# Patient Record
Sex: FEMALE | URBAN - METROPOLITAN AREA
[De-identification: names, ages, dates, MRNs, and addresses within clinical notes are randomized per-mention and may not be internally consistent; named-entity substitution may affect disease eponyms.]

---

## 2017-03-23 NOTE — PROCEDURE NOTE: CLINICAL
PROCEDURE NOTE: Focal Laser, Choroid OD. Diagnosis: Neovascular AMD with Active CNV. Prior to focal laser, risks/benefits/alternatives to laser discussed including scotoma and loss of vision and patient wished to proceed. A written consent is on file, and the need for today’s laser was discussed and the patient is understanding and wishes to proceed. Spot size: 500 um. Pulse power: 850 mW. Number of pulses: 79. Duration:100ms. Procedure Time: 11:54 AM. Patient tolerated procedure well. There were no complications. Post procedure instructions given. Patient given office phone number/answering service number and advised to call immediately should there be loss of vision or pain, or should they have any other questions or concerns. Zahida Mares

## 2017-03-30 NOTE — PROCEDURE NOTE: CLINICAL
PROCEDURE NOTE: Avastin 1.25mg OD. Diagnosis: Neovascular AMD with Active CNV. Anesthesia: Topical. Prep: Betadine Drops. Prior to injection, risks/benefits/alternatives discussed including infection, loss of vision, hemorrhage, cataract, glaucoma, retinal tears or detachment. The off-label status of Intravitreal Avastin also was reviewed. The patient wished to proceed with treatment. Topical anesthesia was induced with Alcaine. Additional anesthesia was achieved using drop(s) or injection checked above. A drop of Povidone-iodine 5% ophthalmic solution was instilled over the injection site and in the inferior fornix. Betadine prep was performed. Using the syringe provided, Avastin 1.25 mg in 0.05 cc was injected into the vitreous cavity. The needle was passed 3.0 mm posterior to the limbus in pseudophakic patients, and 3.5 mm posterior to the limbus in phakic patients. Patient tolerated procedure well. There were no complications. Injection time: 402. Lot #: Esqupo@[email or web address]. Expiration Date: 5/2/2017. Inventory Id: null. Post-op instructions given. The patient was instructed to return for re-evaluation in approximately 4-12 weeks depending on his/her condition and was told to call immediately if vision decreases and/or if his/her eye becomes red, painful, and/or light sensitive. The patient was instructed to go to the emergency room or call 911 if unable to reach the doctor within an hour or two of trying or calling. The patient was instructed to use Artificial Tears q.i.d. p.r.n for comfort. Selma Perez

## 2017-05-15 NOTE — PROCEDURE NOTE: CLINICAL
ANNUAL EXAM note      History    Tyra Mg is a 43 year old female who presents for an annual exam and Pap. Patient denies any abnormal vaginal discharge or bleeding. Has been postmenopausal since 2004. Is on estrogen patch twice a week with no problems. Is in a relationship for less than 1 year. Denies any STD screening. Is scheduled for bone density by her endocrinologist in the near future. Patient recently had fasting lipid panel done through work and will drop off a copy.    Last Menstrual Period: Patient's last menstrual period was 01/01/2004.  Last PAP: 8/2012  Birth Control Method: Postmenopausal  Last Mammogram: 8/2016  Self Breast Exam:  yes  Last Dexascan (BMD):  Yrs ago  Exercise: daily walks and swims  Calcium Intake: thru diet  Vitamin D Intake: 50,000 2 x a week  Last Colonoscopy: 2014 at [facility] (Servings per day)  Vegetables 4  Fruit 2  Dairy 3  Protein 3  Carbs 2  Water 64 oz  Caffeine: 2 sodas diet pepsi  Dining out in one week: 0      medical history    Past Medical History   Diagnosis Date   • Acute pyelonephritis without lesion of renal medullary necrosis 6/12/2012   • Adhesive capsulitis of right shoulder 7/23/2014     Shoulder, knee and ankle   • Arterial ischemic stroke, vertebrobasilar, brainstem, remote, resolved 2003   • AV reentrant tachycardia    • Carotid artery dissection 2003     Patient suffered with left MCA infarction.   • Cerebral infarction 2003   • Chronic vomiting    • Cushing's disease    • Depression      situational   • Esophageal reflux    • Fracture 2011     tib/fib   • Fracture 2009     humerus   • Fracture dislocation of cervical spine    • Gastritis    • Hypotension    • MRSA (methicillin resistant staph aureus) culture positive 5/29/2014     PCR (+)   • Nephrolithiasis    • Occipital neuralgia    • Personal history of traumatic fracture      tibia, fibia, humerus   • Pheochromocytoma    • Pneumonia      frequently   • PONV (postoperative nausea  PROCEDURE NOTE: Avastin 1.25mg OD. Diagnosis: Neovascular AMD with Active CNV. Anesthesia: Topical. Prep: Betadine Drops. Prior to injection, risks/benefits/alternatives discussed including infection, loss of vision, hemorrhage, cataract, glaucoma, retinal tears or detachment. The off-label status of Intravitreal Avastin also was reviewed. The patient wished to proceed with treatment. Topical anesthesia was induced with Alcaine. Additional anesthesia was achieved using drop(s) or injection checked above. A drop of Povidone-iodine 5% ophthalmic solution was instilled over the injection site and in the inferior fornix. Betadine prep was performed. Using the syringe provided, Avastin 1.25 mg in 0.05 cc was injected into the vitreous cavity. The needle was passed 3.0 mm posterior to the limbus in pseudophakic patients, and 3.5 mm posterior to the limbus in phakic patients. Patient tolerated procedure well. There were no complications. Injection time: 12:15 PM. Lot #: Nelly@[email or web address]. Expiration Date: 6729-37-98W09:00:00. Inventory Id: null. Post-op instructions given. The patient was instructed to return for re-evaluation in approximately 4-12 weeks depending on his/her condition and was told to call immediately if vision decreases and/or if his/her eye becomes red, painful, and/or light sensitive. The patient was instructed to go to the emergency room or call 911 if unable to reach the doctor within an hour or two of trying or calling. The patient was instructed to use Artificial Tears q.i.d. p.r.n for comfort. David Faria and vomiting)    • Prolonged Q-T interval on ECG 3/2015   • RAD (reactive airway disease)    • Rotator cuff disorder 6/20/2014   • Seizure    • Seizures    • Spinal headache    • SVT (supraventricular tachycardia)    • Unspecified hypothyroidism 4/3/2015   • Urinary tract infection        SURGICAL history    Past Surgical History   Procedure Laterality Date   • Pituitary surgery     • Adrenalectomy  2003     transphenoidal   • Sinus surgery     • Hb inject nerve occipital  12/13/2012     occipital nerve block   • Knee arthroscopy w/ meniscectomy       Left knee   • Service to gastroenterology  1/2011     Colonoscopy-int. hemorrhoids   • Service to gastroenterology  2/27/2012     EGD gastritis, esophagitis     • Ep study/possible svt ablation  7/14/2014     of AVNRT   • Ep study  10/2014     no inducible SVT/VT   • Brain surgery  2002, 2003     Pituitary 2x   • Eye surgery       cross eye correction when a child   • Removal gallbladder     • Appendectomy  7/2004     partial   • Cardiac surgery       SVT ablasion   • Adrenalectomy  9/2003     adrenalcortical insufficiency   • Adrenal gland surgery Bilateral 2002       social history    Social History     Social History   • Marital status: Single     Spouse name: N/A   • Number of children: N/A   • Years of education: N/A     Social History Main Topics   • Smoking status: Former Smoker     Packs/day: 0.10     Years: 3.00     Types: Cigarettes     Start date: 7/23/1993     Quit date: 1/20/2001   • Smokeless tobacco: Never Used   • Alcohol use No      Comment: very rarely   • Drug use: No   • Sexual activity: Yes     Partners: Male      Comment: x one year     Other Topics Concern   • Weight Concern Yes     Social History Narrative       family history    Family History   Problem Relation Age of Onset   • Thyroid Father    • Arthritis Father    • OTHER Father      Afib., Vit B12 and D deficient   • NEGATIVE FAMILY HX OF Mother    • Arthritis Mother    • High cholesterol  Mother    • Substance abuse Mother      alcoholic   • OTHER Sister      chrons disease   • OTHER Brother      as infant had umbilical hernia   • Depression Maternal Grandmother    • Diabetes Paternal Grandmother    • OTHER Paternal Grandfather      Afib       mEDICATIONS    Current Outpatient Prescriptions   Medication Sig   • potassium chloride (K-DUR,KLOR-CON) 20 MEQ CR tablet Take 1 tablet by mouth daily.   • hydrocortisone (CORTEF) 20 MG tablet take 2 tabs in the morning and 1 tab in the evening for the next 2-3 days and then resume home doses of 20mg in AM and 20 mg in PM   • sodium polystyrene sulfonate (SPS, KAYEXALATE) 15 GM/60ML suspension 60 ml (15 GM) twice a day as instructed   • olopatadine (PATADAY) 0.2 % ophthalmic solution Apply 1 drop into the left eye daily.   • metoCLOPramide (REGLAN) 5 MG tablet Take 1 tablet by mouth 4 times daily as needed for Nausea.   • ondansetron (ZOFRAN) 4 MG tablet Take 1 tablet by mouth every 8 hours as needed for Nausea.   • albuterol (VENTOLIN) (2.5 MG/3ML) 0.083% nebulizer solution Take 2.5 mg by nebulization every 6 hours as needed for Wheezing.   • topiramate (TOPAMAX) 100 MG tablet Take 1 tablet by mouth 2 times daily.   • levothyroxine (SYNTHROID, LEVOTHROID) 112 MCG tablet Take 112 mcg by mouth daily.   • fludrocortisone (FLORINEF) 0.1 MG tablet Take 0.15 mg by mouth daily. Dose: 1 and ½ tablet (= 0.15 mg)   • pantoprazole (PROTONIX) 40 MG tablet Take 40 mg by mouth 2 times daily.   • ergocalciferol (DRISDOL) 32063 UNITS capsule Take 50,000 Units by mouth twice a week. Take on Wednesday and Sunday.   • estradiol (VIVELLE-DOT) 0.1 MG/24HR patch Place 1 patch onto the skin twice a week. Apply one patch on Sunday and Wednesday   • folic acid (FOLATE) 1 MG tablet Take 1 tablet by mouth daily.   • midodrine (PROAMATINE) 5 MG tablet Take 10 mg by mouth 3 times daily. Dose: 2 Tabs (= 10 mg)   • oral electrolytes (THERMOTABS) TABS Take 1 tablet by mouth 3 times daily as  needed.      No current facility-administered medications for this visit.        aLLERGIES    ALLERGIES:   Allergen Reactions   • Imitrex [Sumatriptan Base] ANAPHYLAXIS   • Levaquin RASH   • Tigan SHORTNESS OF BREATH and ANAPHYLAXIS     Reported as adverse drug reaction   • Unknown ANAPHYLAXIS     Passion fruit   • Morphine HIVES     Pt tolerates hydromorphone   • Opioid Analgesics RASH and PRURITUS     Morphine and percocet cause puritits   • Benadryl [Diphenhydramine Hcl] Other (See Comments)     Rapid heart rate   • Percocet [Oxycodone-Acetaminophen] PRURITUS   • Latex RASH and Other (See Comments)     Sensitivity  And wheezing       Review of systems      Constitutional:  Denies fevers, chills, weakness, loss of appetite, abnormal weight gain or abnormal weight loss.    Eyes:  Denies blindness, blurred vision, double vision, pain, itching or burning.    HENT:  Denies facial pain, ear pain, hearing loss, tinnitus, nasal congestion, rhinorrhea, epistaxis, sinus pain, mouth lesions or sore throat.    Respiratory:  Denies SOB, cough, sputum production, hemoptysis or wheezing.    Cardiovascular:  Denies chest pains, palpitations, tachycardia, edema, cyanosis or vertigo.    Gastrointestinal:  Denies  constipation or blood in stool.    Musculoskeletal:  Denies back pain, joint pain, joint swelling or tenderness, muscle pains or spasms. Denies neck pain, stiffness or swelling.    Neurologic:  Denies numbness, tingling, other sensory changes, sudden weakness in arms or legs. Denies confusion, headache, dizziness, memory loss or tremors.    Genitourinary:  Denies urinary frequency, nocturia, urgency, incontinence, dysuria or hematuria.    Hematologic/Lymph:  Denies easy bruising or bleeding, swollen lymph glands.    Endocrine:  Denies heat or cold intolerance, polydipsia or polyuria.  Denies changes in hair or skin texture.    Integument:  Denies new rashes or lesions, pruritus or dryness of skin.    Psychiatric:  Denies  anxiety, depression, hallucinations, irritability or sleeping problems.   Allergic/Immunologic:  Denies recurrent infections, hypersensitivity.      All other Review of Systems negative.      Physical Exam    Vital Signs:  Blood pressure 90/70, pulse 64, height 5' 2\" (1.575 m), weight 77.6 kg, last menstrual period 01/01/2004. Body mass index is 31.28 kg/(m^2).  General:  Well developed, well nourished. In no apparent distress.    Eyes:  PERRL, EOMI. Conjunctivae pink. Sclerae anicteric.    HENT:  Normocephalic, atraumatic. Bilateral external ears are normal. Mucosal membranes moist. External nose is normal. Oropharynx is clear.    Neck:  Supple. Nontender. Normal range of motion. No masses. No thyromegaly.  Trachea midline.  Respiratory:  Normal respiratory effort. No chest wall tenderness. Lungs clear to auscultation bilaterally. Symmetrical chest expansion.    Cardiovascular:  Regular rate and rhythm. No murmurs, rubs, or gallops. Normal S1 and S2.  No carotid bruits. Good dorsalis pedis pulses bilaterally. No peripheral edema.  Gastrointestinal:  Soft. Nontender. Nondistended. Normal bowel sounds. No pulsatile or other abdominal masses. No hepatosplenomegaly or splenomegaly.    Breasts:  Symmetric. No masses. No nipple discharge.    Pelvic:  Normal external genitalia. Normal vaginal vault. Shiny and atrophic. The cervical os is without lesions or discharge. Pap obtained. The uterus is not enlarged or tender. There are no adnexal masses or tenderness.  Musculoskeletal:  No clubbing or cyanosis. Full range of motion in all 4 extremities proximal and distal. No joint swelling or tenderness in right and left shoulders, elbows, wrists and fingers. No joint swelling or tenderness in left and right knees, ankles and toes.    Neurologic:  Alert and oriented x 3. Gait is normal. Normal sensory function. No motor deficits in all 4 extremities. Cranial nerves II-XII intact. Symmetrical bilateral knee DTR’s.  Negative  Babinski.    Integumentary:  Warm. Dry. Pink. No rashes or lesions. No wounds.    Lymphatic:  No lymphadenopathy in submental, submandibular or cervical chain. No supraclavicular or infraclavicular lymphadenopathy. No axillary or inguinal/groin lymphadenopathy.    Psychiatric: Cooperative. Appropriate mood and affect. Normal judgment.      Results    Pertinent labs and imaging studies reviewed.      Assessment  1. Well woman exam with routine gynecological exam -Pap pending. If normal and negative HPV, repeat in 5 years. Patient will drop off fasting lipid panel done in the last month    2. Hypothyroidism due to non-medication exogenous substances -treatment per Dr. Mireya Ellison    3. Nausea and vomiting in adult -stable    4. Adrenal insufficiency -stable    5.  Atrophic vaginitis: Continue estrogen patch    PLAN:  Orders Placed This Encounter   • Thin Prep Pap Test with HPV regardless       Return if symptoms worsen or fail to improve.

## 2018-05-01 ENCOUNTER — IMPORTED ENCOUNTER (OUTPATIENT)
Dept: URBAN - METROPOLITAN AREA CLINIC 9 | Facility: CLINIC | Age: 68
End: 2018-05-01

## 2018-06-12 ENCOUNTER — IMPORTED ENCOUNTER (OUTPATIENT)
Dept: URBAN - METROPOLITAN AREA CLINIC 9 | Facility: CLINIC | Age: 68
End: 2018-06-12

## 2018-12-13 NOTE — PATIENT DISCUSSION
Does NOT APPEAR VISUALLY SIGNIFICANT. O-Z Plasty Text: The defect edges were debeveled with a #15 scalpel blade.  Given the location of the defect, shape of the defect and the proximity to free margins an O-Z plasty (double transposition flap) was deemed most appropriate.  Using a sterile surgical marker, the appropriate transposition flaps were drawn incorporating the defect and placing the expected incisions within the relaxed skin tension lines where possible.    The area thus outlined was incised deep to adipose tissue with a #15 scalpel blade.  The skin margins were undermined to an appropriate distance in all directions utilizing iris scissors.  Hemostasis was achieved with electrocautery.  The flaps were then transposed into place, one clockwise and the other counterclockwise, and anchored with interrupted buried subcutaneous sutures.

## 2019-05-17 ENCOUNTER — IMPORTED ENCOUNTER (OUTPATIENT)
Dept: URBAN - METROPOLITAN AREA CLINIC 9 | Facility: CLINIC | Age: 69
End: 2019-05-17

## 2019-06-05 ENCOUNTER — IMPORTED ENCOUNTER (OUTPATIENT)
Dept: URBAN - METROPOLITAN AREA CLINIC 9 | Facility: CLINIC | Age: 69
End: 2019-06-05

## 2019-12-04 ENCOUNTER — IMPORTED ENCOUNTER (OUTPATIENT)
Dept: URBAN - METROPOLITAN AREA CLINIC 9 | Facility: CLINIC | Age: 69
End: 2019-12-04

## 2019-12-04 PROBLEM — H43.813: Noted: 2019-12-04

## 2019-12-04 PROBLEM — H25.13: Noted: 2019-12-04

## 2019-12-04 PROBLEM — H25.12: Noted: 2019-12-04

## 2019-12-04 PROBLEM — H35.3131: Noted: 2019-12-04

## 2019-12-04 PROBLEM — H35.042: Noted: 2019-12-04

## 2021-03-04 NOTE — PATIENT DISCUSSION
Recommended OBSERVATION and MONITORING for change. Relevant Problems   No relevant active problems       Anesthetic History   No history of anesthetic complications            Review of Systems / Medical History  Patient summary reviewed, nursing notes reviewed and pertinent labs reviewed    Pulmonary  Within defined limits                 Neuro/Psych   Within defined limits           Cardiovascular  Within defined limits                Exercise tolerance: >4 METS     GI/Hepatic/Renal  Within defined limits              Endo/Other  Within defined limits           Other Findings   Comments: Anxiety - pt states her perioperative nausea is anxiety related, she does not want any additional anti-emetics beyond standard           Physical Exam    Airway  Mallampati: II    Neck ROM: normal range of motion   Mouth opening: Normal     Cardiovascular  Regular rate and rhythm,  S1 and S2 normal,  no murmur, click, rub, or gallop  Rhythm: regular  Rate: normal         Dental  No notable dental hx       Pulmonary  Breath sounds clear to auscultation               Abdominal  GI exam deferred       Other Findings            Anesthetic Plan    ASA: 2  Anesthesia type: general          Induction: Intravenous  Anesthetic plan and risks discussed with: Patient

## 2021-10-16 ASSESSMENT — VISUAL ACUITY
OS_CC: 20/25 SN
OS_CC: 20/25 SN
OS_CC: 20/30 - SN
OD_CC: 20/25 SN
OS_CC: 20/25 - SN
OS_CC: 20/50 SN
OD_CC: 20/50 - SN
OS_CC: 20/25 SN
OD_CC: 20/30 SN
OD_CC: 20/30 -2 SN
OD_CC: 20/25 - SN
OS_CC: 20/30 SN
OS_SC: CF 2FT SN
OD_CC: 20/25 - SN
OD_CC: 20/25 SN
OD_SC: CF 2FT SN

## 2021-10-16 ASSESSMENT — TONOMETRY
OD_IOP_MMHG: 15
OS_IOP_MMHG: 15
OD_IOP_MMHG: 18
OS_IOP_MMHG: 17
OS_IOP_MMHG: 18
OD_IOP_MMHG: 20
OS_IOP_MMHG: 18
OD_IOP_MMHG: 18
OS_IOP_MMHG: 18
OD_IOP_MMHG: 20

## 2023-11-07 ENCOUNTER — NEW PATIENT (OUTPATIENT)
Facility: LOCATION | Age: 73
End: 2023-11-07

## 2023-11-07 DIAGNOSIS — H04.123: ICD-10-CM

## 2023-11-07 DIAGNOSIS — H04.213: ICD-10-CM

## 2023-11-07 DIAGNOSIS — H25.13: ICD-10-CM

## 2023-11-07 DIAGNOSIS — H43.813: ICD-10-CM

## 2023-11-07 DIAGNOSIS — H35.433: ICD-10-CM

## 2023-11-07 DIAGNOSIS — H35.042: ICD-10-CM

## 2023-11-07 DIAGNOSIS — H52.13: ICD-10-CM

## 2023-11-07 DIAGNOSIS — H35.3131: ICD-10-CM

## 2023-11-07 DIAGNOSIS — R73.09: ICD-10-CM

## 2023-11-07 PROCEDURE — 92004 COMPRE OPH EXAM NEW PT 1/>: CPT

## 2023-11-07 PROCEDURE — 92015 DETERMINE REFRACTIVE STATE: CPT

## 2023-11-07 PROCEDURE — 92134 CPTRZ OPH DX IMG PST SGM RTA: CPT

## 2023-11-07 ASSESSMENT — KERATOMETRY
OS_K1POWER_DIOPTERS: 43.00
OS_K2POWER_DIOPTERS: 43.50
OD_K1POWER_DIOPTERS: 43.00
OS_AXISANGLE_DEGREES: 95
OD_K2POWER_DIOPTERS: 44.00
OD_AXISANGLE2_DEGREES: 177
OS_AXISANGLE2_DEGREES: 5
OD_AXISANGLE_DEGREES: 87

## 2023-11-07 ASSESSMENT — TONOMETRY
OS_IOP_MMHG: 19
OD_IOP_MMHG: 18

## 2023-11-07 ASSESSMENT — VISUAL ACUITY
OU_SC: J1+
OS_GLARE: 20/40
OS_SC: 20/400
OD_GLARE: 20/40
OS_SC: J1
OU_SC: 20/200
OD_SC: 20/200
OD_SC: J1

## 2023-11-22 ENCOUNTER — ESTABLISHED PATIENT (OUTPATIENT)
Facility: LOCATION | Age: 73
End: 2023-11-22

## 2023-11-22 DIAGNOSIS — H25.13: ICD-10-CM

## 2023-11-22 DIAGNOSIS — H35.3131: ICD-10-CM

## 2023-11-22 DIAGNOSIS — H35.072: ICD-10-CM

## 2023-11-22 DIAGNOSIS — H43.813: ICD-10-CM

## 2023-11-22 PROCEDURE — 99214 OFFICE O/P EST MOD 30 MIN: CPT

## 2023-11-22 PROCEDURE — 92250 FUNDUS PHOTOGRAPHY W/I&R: CPT

## 2023-11-22 ASSESSMENT — VISUAL ACUITY
OS_CC: 20/25
OD_CC: 20/30-1

## 2023-11-22 ASSESSMENT — TONOMETRY
OD_IOP_MMHG: 18
OS_IOP_MMHG: 17

## 2023-12-28 NOTE — PATIENT DISCUSSION
ARTIFICIAL TEARS to affected eye(s) as needed.
BMI Within normal limits, continue current management.
Continue to MONITOR CLOSELY to determine the need for TREATMENT and INCREASE/DECREASE in length of time till next follow up visit.
Does NOT APPEAR VISUALLY SIGNIFICANT.
ERM does NOT APPEAR VISUALLY SIGNIFICANT.
HEM RESOLVED - NO RECURRENT CNV ON IMAGING - TO FOLLOW OFF MEDS.
LAST AVASTIN 5 15 17.
MONITOR response to TREATMENT.
My findings and recommendations are based on patient's symptoms, eye exam, diagnostic testing, and records.
NO PROGRESSION ON OCT.
NO SIGN EDEMA/CNV ON OCT 8 7 17.
NONSMOKER.
No retinal tears or retinal detachment seen on clinical exam today. Reviewed the signs and symptoms of retinal tear/retinal detachment and the importance of calling for prompt evaluation should there be increasing floaters, new flashing lights, or decreasing peripheral vision in either eye at any time. Observation recommended.
PERIPHERAL CNV WITH SRH - 3 23 17.
Recommend OBSERVATION and continued MONITORING for progression.
Recommended OBSERVATION and MONITORING for change.
Recommended OBSERVATION and continued MONITORING for progression.
Well positioned.
9 (severe pain)

## 2024-05-15 NOTE — PATIENT DISCUSSION
Continue to MONITOR CLOSELY to determine the need for TREATMENT and INCREASE/DECREASE in length of time till next follow up visit. room air

## 2024-11-11 ENCOUNTER — FOLLOW UP (OUTPATIENT)
Facility: LOCATION | Age: 74
End: 2024-11-11

## 2024-11-11 DIAGNOSIS — H35.433: ICD-10-CM

## 2024-11-11 DIAGNOSIS — H52.13: ICD-10-CM

## 2024-11-11 DIAGNOSIS — R73.09: ICD-10-CM

## 2024-11-11 DIAGNOSIS — H04.213: ICD-10-CM

## 2024-11-11 DIAGNOSIS — H25.13: ICD-10-CM

## 2024-11-11 DIAGNOSIS — H35.072: ICD-10-CM

## 2024-11-11 DIAGNOSIS — H35.3131: ICD-10-CM

## 2024-11-11 DIAGNOSIS — H04.123: ICD-10-CM

## 2024-11-11 PROCEDURE — 92014 COMPRE OPH EXAM EST PT 1/>: CPT

## 2024-11-11 PROCEDURE — 92134 CPTRZ OPH DX IMG PST SGM RTA: CPT

## 2024-11-11 PROCEDURE — 92015 DETERMINE REFRACTIVE STATE: CPT

## 2024-12-13 ENCOUNTER — PRE-OP/H&P (OUTPATIENT)
Age: 74
End: 2024-12-13

## 2024-12-13 DIAGNOSIS — H04.213: ICD-10-CM

## 2024-12-13 DIAGNOSIS — H35.3131: ICD-10-CM

## 2024-12-13 DIAGNOSIS — H35.072: ICD-10-CM

## 2024-12-13 DIAGNOSIS — R73.09: ICD-10-CM

## 2024-12-13 DIAGNOSIS — H35.433: ICD-10-CM

## 2024-12-13 DIAGNOSIS — H04.123: ICD-10-CM

## 2024-12-13 DIAGNOSIS — H25.13: ICD-10-CM

## 2024-12-13 PROCEDURE — 99211PRE PRE OP VISIT

## 2024-12-13 PROCEDURE — 92136 OPHTHALMIC BIOMETRY: CPT

## 2025-01-06 ENCOUNTER — COMPREHENSIVE EXAM (OUTPATIENT)
Age: 75
End: 2025-01-06

## 2025-01-06 DIAGNOSIS — H35.3131: ICD-10-CM

## 2025-01-06 DIAGNOSIS — H43.813: ICD-10-CM

## 2025-01-06 DIAGNOSIS — H35.072: ICD-10-CM

## 2025-01-06 DIAGNOSIS — H25.13: ICD-10-CM

## 2025-01-06 PROCEDURE — 99214 OFFICE O/P EST MOD 30 MIN: CPT

## 2025-01-06 PROCEDURE — 92250 FUNDUS PHOTOGRAPHY W/I&R: CPT

## 2025-01-09 ENCOUNTER — SURGERY/PROCEDURE (OUTPATIENT)
Age: 75
End: 2025-01-09

## 2025-01-09 DIAGNOSIS — H25.13: ICD-10-CM

## 2025-01-09 PROCEDURE — 66984AV REMOVE CATARACT, INSERT ADVANCED LENS

## 2025-01-09 PROCEDURE — 99199PAV PROF ADVANCED VISION PACKAGE

## 2025-01-10 ENCOUNTER — POST OP/EVAL OF SECOND EYE (OUTPATIENT)
Age: 75
End: 2025-01-10

## 2025-01-10 DIAGNOSIS — H25.12: ICD-10-CM

## 2025-01-10 DIAGNOSIS — Z98.41: ICD-10-CM

## 2025-01-30 ENCOUNTER — SURGERY/PROCEDURE (OUTPATIENT)
Age: 75
End: 2025-01-30

## 2025-01-30 DIAGNOSIS — H57.03: ICD-10-CM

## 2025-01-30 DIAGNOSIS — H25.13: ICD-10-CM

## 2025-01-30 PROCEDURE — 66984AV REMOVE CATARACT, INSERT ADVANCED LENS: Mod: 79,LT

## 2025-01-30 PROCEDURE — 99199PAV PROF ADVANCED VISION PACKAGE

## 2025-01-31 ENCOUNTER — POST-OP (OUTPATIENT)
Age: 75
End: 2025-01-31

## 2025-01-31 DIAGNOSIS — Z98.41: ICD-10-CM

## 2025-01-31 DIAGNOSIS — H04.123: ICD-10-CM

## 2025-01-31 DIAGNOSIS — Z98.42: ICD-10-CM

## 2025-02-17 ENCOUNTER — POST-OP (OUTPATIENT)
Age: 75
End: 2025-02-17

## 2025-02-17 DIAGNOSIS — H35.3131: ICD-10-CM

## 2025-02-17 DIAGNOSIS — Z98.42: ICD-10-CM

## 2025-02-17 DIAGNOSIS — Z98.41: ICD-10-CM

## 2025-02-17 DIAGNOSIS — H04.123: ICD-10-CM

## 2025-05-20 ENCOUNTER — COMPREHENSIVE EXAM (OUTPATIENT)
Age: 75
End: 2025-05-20

## 2025-05-20 DIAGNOSIS — Z98.42: ICD-10-CM

## 2025-05-20 DIAGNOSIS — Z98.41: ICD-10-CM

## 2025-05-20 DIAGNOSIS — H35.433: ICD-10-CM

## 2025-05-20 DIAGNOSIS — H35.3131: ICD-10-CM

## 2025-05-20 DIAGNOSIS — H04.123: ICD-10-CM

## 2025-05-20 PROCEDURE — 92134 CPTRZ OPH DX IMG PST SGM RTA: CPT

## 2025-05-20 PROCEDURE — 92014 COMPRE OPH EXAM EST PT 1/>: CPT
